# Patient Record
Sex: FEMALE | Race: ASIAN | NOT HISPANIC OR LATINO | Employment: OTHER | URBAN - METROPOLITAN AREA
[De-identification: names, ages, dates, MRNs, and addresses within clinical notes are randomized per-mention and may not be internally consistent; named-entity substitution may affect disease eponyms.]

---

## 2017-01-10 ENCOUNTER — OFFICE VISIT (OUTPATIENT)
Dept: NEUROSURGERY | Facility: CLINIC | Age: 72
End: 2017-01-10

## 2017-01-10 VITALS
BODY MASS INDEX: 20.66 KG/M2 | DIASTOLIC BLOOD PRESSURE: 58 MMHG | WEIGHT: 124 LBS | SYSTOLIC BLOOD PRESSURE: 88 MMHG | TEMPERATURE: 98.4 F | HEIGHT: 65 IN

## 2017-01-10 DIAGNOSIS — M51.36 DEGENERATIVE DISC DISEASE, LUMBAR: Primary | ICD-10-CM

## 2017-01-10 PROCEDURE — 99203 OFFICE O/P NEW LOW 30 MIN: CPT | Performed by: NEUROLOGICAL SURGERY

## 2017-01-10 RX ORDER — OXYCODONE AND ACETAMINOPHEN 10; 325 MG/1; MG/1
1 TABLET ORAL AS NEEDED
COMMUNITY
End: 2020-07-14 | Stop reason: HOSPADM

## 2017-01-10 RX ORDER — MELOXICAM 7.5 MG/1
7.5 TABLET ORAL 2 TIMES DAILY
Qty: 30 TABLET | Refills: 0 | Status: SHIPPED | OUTPATIENT
Start: 2017-01-10 | End: 2020-07-14 | Stop reason: HOSPADM

## 2017-01-10 NOTE — MR AVS SNAPSHOT
Mani Dailey   1/10/2017 10:30 AM   Office Visit    Dept Phone:  [phone]   Encounter #:  [id number]    Provider:  Blade Garcia MD   Department:  [facility]                Your Full Care Plan              Today's Medication Changes          These changes are accurate as of: 1/10/17 11:03 AM.  If you have any questions, ask your nurse or doctor.               New Medication(s)Ordered:     meloxicam 7.5 MG tablet   Commonly known as:  MOBIC   Take 1 tablet by mouth 2 (Two) Times a Day.   Started by:  Blade Garcia MD         Stop taking medication(s)listed here:     acyclovir 400 MG tablet   Commonly known as:  ZOVIRAX   Stopped by:  Blade Garcia MD           buPROPion 75 MG tablet   Commonly known as:  WELLBUTRIN   Stopped by:  Blade Garcia MD           COVARYX HS 0.625-1.25 MG per tablet   Generic drug:  estrogens (conjugated)-methyltestosterone   Stopped by:  Blade Garcia MD                Where to Get Your Medications      These medications were sent to [address] [place] [id number] [place] [phone]   [address] [facility] [address]     Phone:  [phone]     meloxicam 7.5 MG tablet                  Your Updated Medication List          This list is accurate as of: 1/10/17 11:03 AM.  Always use your most recent med list.                ALPRAZolam 1 MG tablet   Commonly known as:  XANAX       busPIRone 15 MG tablet   Commonly known as:  BUSPAR       fluorometholone 0.1 % ophthalmic suspension   Commonly known as:  FML       FLUoxetine 40 MG capsule   Commonly known as:  PROzac       levocetirizine 5 MG tablet   Commonly known as:  XYZAL       meloxicam 7.5 MG tablet   Commonly known as:  MOBIC   Take 1 tablet by mouth 2 (Two) Times a Day.       MELQUIN HP 4 % cream   Generic drug:  hydroquinone       montelukast 10 MG tablet   Commonly known as:  SINGULAIR       oxyCODONE-acetaminophen  MG per tablet   Commonly known as:  PERCOCET       PATADAY 0.2 % solution ophthalmic solution   Generic drug:  olopatadine       potassium chloride 8 MEQ CR tablet   Commonly known as:  KLOR-CON       PROAIR  (90 BASE) MCG/ACT inhaler   Generic drug:  albuterol       topiramate 50 MG tablet   Commonly known as:  TOPAMAX       triamterene-hydrochlorothiazide 75-50 MG per tablet   Commonly known as:  MAXZIDE               We Performed the Following     Ambulatory Referral to Pain Management     SCANNED - IMAGING       You Were Diagnosed With        Codes Comments    Degenerative disc disease, lumbar    -  Primary ICD-10-CM: M51.36  ICD-9-CM: 722.52       Instructions      After seeing Dr. Esquivel, call Dr. Garcia on a Monday or Tuesday and leave a message with Aileen (). Dr. Garcia will call you back at the end of the day as soon as he can.      Patient Instructions History      Upcoming Appointments     Visit Type Date Time Department    NEW PATIENT 1/10/2017 10:30 AM [facility]    COMM - FOLLOWUP PT 2017  3:00 PM [facility]      [employer] Signup     [facility] Data [employer]place allows you to send messages to your doctor, view your test results, renew your prescriptions, schedule appointments, and more. To sign up, go to [employer] and click on the Sign Up Now link in the New User? box. Enter your Data Marketplace Activation Code exactly as it appears below along with the last four digits of your Social Security Number and your Date of Birth () to complete the sign-up process. If you do not sign up before the expiration date, you must request a new code.    Data Marketplace Activation Code: [email or web address]  Expires: 2017 10:57 AM    If you have questions, you can email [email or web address] or call [phone] to talk to our Data Marketplace staff. Remember, Data Marketplace is NOT to be used for urgent needs. For medical emergencies, dial 911.              "  Other Info from Your Visit           Your Appointments     Jan 19, 2017  3:00 PM EST   COMM FOLLOWUP PT with Monica Milan, PT   [facility] PHYSICAL [facility]ERAP[facility] (--)    [address], [address]  [facility] [address] [phone]              Allergies     Toradol [Ketorolac Tromethamine]        Reason for Visit     Back Pain           Vital Signs     Blood Pressure Temperature Height Weight Body Mass Index Smoking Status    88/58 98.4 °F (36.9 °C) 65\" (165.1 cm) 124 lb (56.2 kg) 20.63 kg/m2 Never Smoker      Problems and Diagnoses Noted     Degeneration of lumbar or lumbosacral intervertebral disc    -  Primary      Results     SCANNED - IMAGING               "

## 2017-01-10 NOTE — PATIENT INSTRUCTIONS
After seeing Dr. Esquivel, call Dr. Garcia on a Monday or Tuesday and leave a message with Aileen (). Dr. Garcia will call you back at the end of the day as soon as he can.

## 2017-01-10 NOTE — LETTER
January 10, 2017     Jorge Cameron MD  [facility] [address]    Patient: Mani Dailey   YOB: 1945   Date of Visit: 1/10/2017       Dear Dr. Rakesh MD:    Mani Dailey was in my office today. Below is a copy of my note.    If you have questions, please do not hesitate to call me. I look forward to following Mani along with you.         Sincerely,        Blade Garcia MD        CC: MD Monica Johnson, PT  Mike Esquivel MD    Mani Dailey  1945  [id number]      Chief Complaint   Patient presents with   • Back Pain       HISTORY OF PRESENT ILLNESS:  This is a 71-year-old female who has a genetic predisposition for degenerative osteoarthritis.  She has had a several year history of low back pain but has done reasonably well with physical therapy until the past 6 months.  She now has symptoms consistent with the clinical diagnosis of neurogenic claudication.  She has pain numbness and weakness with ambulation and when she climbs stairs.  She has no bowel or bladder dysfunction however.    Diagnostic studies included lumbar spine x-rays and MRI which prompted neurosurgical consideration and she is referred for an opinion.     Past Medical History   Diagnosis Date   • Asthma    • Bursitis of hip    • Cataract    • Depression    • Hypertension    • IBS (irritable bowel syndrome)    • Lumbosacral disc disease    • Meniere's disease    • Multiple allergies        Past Surgical History   Procedure Laterality Date   • Foot surgery     • Hysterectomy     • D&c and laparoscopy     • Hemorrhoidectomy     • Facial cosmetic surgery         Family History   Problem Relation Age of Onset   • Hypertension Mother    • Stroke Mother    • Heart failure Maternal Aunt    • Stroke Maternal Aunt        Social History     Social History   • Marital status:      Spouse name: N/A   • Number of children: N/A   • Years of education: N/A     Occupational History   • Not on file.      Social History Main Topics   • Smoking status: Never Smoker   • Smokeless tobacco: Not on file   • Alcohol use 4.2 oz/week     7 Glasses of wine per week      Comment: occassionally   • Drug use: No   • Sexual activity: Not on file     Other Topics Concern   • Not on file     Social History Narrative       Allergies   Allergen Reactions   • Toradol [Ketorolac Tromethamine]          Current Outpatient Prescriptions:   •  ALPRAZolam (XANAX) 1 MG tablet, At Night As Needed., Disp: , Rfl: 0  •  busPIRone (BUSPAR) 15 MG tablet, 15 mg 2 (Two) Times a Day., Disp: , Rfl: 1  •  fluorometholone (FML) 0.1 % ophthalmic suspension, , Disp: , Rfl: 0  •  FLUoxetine (PROzac) 40 MG capsule, Daily., Disp: , Rfl: 1  •  levocetirizine (XYZAL) 5 MG tablet, , Disp: , Rfl: 0  •  MELQUIN HP 4 % cream, apply to affected area as directed, Disp: , Rfl: 0  •  montelukast (SINGULAIR) 10 MG tablet, Daily., Disp: , Rfl: 0  •  oxyCODONE-acetaminophen (PERCOCET)  MG per tablet, Take 1 tablet by mouth As Needed for moderate pain (4-6)., Disp: , Rfl:   •  PATADAY 0.2 % solution ophthalmic solution, , Disp: , Rfl: 0  •  potassium chloride (KLOR-CON) 8 MEQ CR tablet, Daily., Disp: , Rfl: 1  •  PROAIR  (90 BASE) MCG/ACT inhaler, , Disp: , Rfl: 1  •  topiramate (TOPAMAX) 50 MG tablet, 150 mg Daily., Disp: , Rfl: 1  •  triamterene-hydrochlorothiazide (MAXZIDE) 75-50 MG per tablet, take 1 tablet by mouth once daily if needed edema, Disp: , Rfl: 0    Review of Systems   Constitutional: Negative for activity change, appetite change, chills, diaphoresis, fatigue, fever and unexpected weight change.   HENT: Positive for hearing loss. Negative for congestion, dental problem, drooling, ear discharge, ear pain, facial swelling, mouth sores, nosebleeds, postnasal drip, rhinorrhea, sinus pressure, sneezing, sore throat, tinnitus, trouble swallowing and voice change.    Eyes: Positive for itching. Negative for photophobia, pain, discharge, redness  "and visual disturbance.   Respiratory: Negative for apnea, cough, choking, chest tightness, shortness of breath, wheezing and stridor.    Cardiovascular: Negative for chest pain, palpitations and leg swelling.   Gastrointestinal: Negative for abdominal distention, abdominal pain, anal bleeding, blood in stool, constipation, diarrhea, nausea, rectal pain and vomiting.   Endocrine: Negative for cold intolerance, heat intolerance, polydipsia, polyphagia and polyuria.   Genitourinary: Positive for difficulty urinating. Negative for decreased urine volume, dysuria, enuresis, flank pain, frequency, genital sores, hematuria and urgency.   Musculoskeletal: Positive for back pain. Negative for arthralgias, gait problem, joint swelling, myalgias, neck pain and neck stiffness.   Skin: Negative for color change, pallor, rash and wound.   Allergic/Immunologic: Negative for environmental allergies, food allergies and immunocompromised state.   Neurological: Positive for weakness. Negative for dizziness, tremors, seizures, syncope, facial asymmetry, speech difficulty, light-headedness, numbness and headaches.   Hematological: Negative for adenopathy. Does not bruise/bleed easily.   Psychiatric/Behavioral: Positive for dysphoric mood and sleep disturbance. Negative for agitation, behavioral problems, confusion, decreased concentration, hallucinations, self-injury and suicidal ideas. The patient is not nervous/anxious and is not hyperactive.    All other systems reviewed and are negative.      Neurological Examination:    Vitals:    01/10/17 1022   BP: (!) 88/58   Temp: 98.4 °F (36.9 °C)   Weight: 124 lb (56.2 kg)   Height: 65\" (165.1 cm)       Mental status/speech: The patient is alert and oriented.  Speech is clear without aphysia or dysarthria.  No overt cognitive deficits.    Cranial nerve examination:    Olfaction: Smell is intact.  Vision: Vision is intact without visual field abnormalities.  Funduscopic examination is normal. " " No pupillary irregularity.  Ocular motor examination: The extraocular muscles are intact.  There is no diplopia.  The pupil is round and reactive to both light and accommodation.  There is no nystagmus.  Facial movement/sensation: There is no facial weakness.  Sensation is intact in the first, second, and third divisions of the trigeminal nerve.  The corneal reflex is intact.  Auditory: Hearing is intact to finger rub bilaterally.  Cranial nerves IX, X, XI, XII: Phonation is normal.  No dysphagia.  Tongue is protruded in the midline without atrophy.  The gag reflex is intact.  Shoulder shrug is normal.    Musculoligamentous ligamentous examination: She had decreased range of motion.  Straight leg raising, Piercy and flip test are negative.  Her strength with encouragement is essentially normal.  The deep tendon reflexes are hypoactive yet are elicitable.  There is no Babinski Danny or clonus.  Her gait is normal.    Medical Decision Making:     Diagnostic Data Set:  Motor spine x-ray show significant degenerative disc disease and disc space narrowing with a scoliosis toward her left area the MRI, unfortunately could not be \"opened\".  The report however shows multilevel degenerative disc disease with moderate to severe facet arthritis at L2/3; 3/4; L4/5 and L5/S1.      Assessment:  [ Multilevel degenerative osteoarthritis with lateral  scoliosis and spinal stenosis. ]          Recommendations:  [ I have given her a prescription for Mobic 7.5 twice a day and have referred her to Mike Enciso for epidural steroid injection and facet block.  She will call me subsequently.  If she shows no improvement I would recommend further studies with an aimed toward surgical intervention.  Apparently she has significant degenerative change, scoliosis and spinal stenosis.  Surgery would entail laminectomies and multilevel spinal fusion with pedicle screw fixation perhaps from L2 through the sacrum.  We'll keep you " informed.]        I greatly appreciate the opportunity to see and evaluate this individual.  If you have questions or concerns regarding issues that I may have overlooked please call me at any time: [phone].  Arthur Garcia M.D.  [facility]  [address] [facility].  [place] [address]    Scribed for Blade Garcia MD by Vaishali Bernabe CMA. 1/10/2017  10:32 AM    I have read and concur with the information provided by the scribe.

## 2017-01-10 NOTE — PROGRESS NOTES
Mani Dailey  1945  [id number]      Chief Complaint   Patient presents with   • Back Pain       HISTORY OF PRESENT ILLNESS:  This is a 71-year-old female who has a genetic predisposition for degenerative osteoarthritis.  She has had a several year history of low back pain but has done reasonably well with physical therapy until the past 6 months.  She now has symptoms consistent with the clinical diagnosis of neurogenic claudication.  She has pain numbness and weakness with ambulation and when she climbs stairs.  She has no bowel or bladder dysfunction however.    Diagnostic studies included lumbar spine x-rays and MRI which prompted neurosurgical consideration and she is referred for an opinion.     Past Medical History   Diagnosis Date   • Asthma    • Bursitis of hip    • Cataract    • Depression    • Hypertension    • IBS (irritable bowel syndrome)    • Lumbosacral disc disease    • Meniere's disease    • Multiple allergies        Past Surgical History   Procedure Laterality Date   • Foot surgery     • Hysterectomy     • D&c and laparoscopy     • Hemorrhoidectomy     • Facial cosmetic surgery         Family History   Problem Relation Age of Onset   • Hypertension Mother    • Stroke Mother    • Heart failure Maternal Aunt    • Stroke Maternal Aunt        Social History     Social History   • Marital status:      Spouse name: N/A   • Number of children: N/A   • Years of education: N/A     Occupational History   • Not on file.     Social History Main Topics   • Smoking status: Never Smoker   • Smokeless tobacco: Not on file   • Alcohol use 4.2 oz/week     7 Glasses of wine per week      Comment: occassionally   • Drug use: No   • Sexual activity: Not on file     Other Topics Concern   • Not on file     Social History Narrative       Allergies   Allergen Reactions   • Toradol [Ketorolac Tromethamine]          Current Outpatient Prescriptions:   •  ALPRAZolam (XANAX) 1 MG tablet, At Night As Needed., Disp: ,  Rfl: 0  •  busPIRone (BUSPAR) 15 MG tablet, 15 mg 2 (Two) Times a Day., Disp: , Rfl: 1  •  fluorometholone (FML) 0.1 % ophthalmic suspension, , Disp: , Rfl: 0  •  FLUoxetine (PROzac) 40 MG capsule, Daily., Disp: , Rfl: 1  •  levocetirizine (XYZAL) 5 MG tablet, , Disp: , Rfl: 0  •  MELQUIN HP 4 % cream, apply to affected area as directed, Disp: , Rfl: 0  •  montelukast (SINGULAIR) 10 MG tablet, Daily., Disp: , Rfl: 0  •  oxyCODONE-acetaminophen (PERCOCET)  MG per tablet, Take 1 tablet by mouth As Needed for moderate pain (4-6)., Disp: , Rfl:   •  PATADAY 0.2 % solution ophthalmic solution, , Disp: , Rfl: 0  •  potassium chloride (KLOR-CON) 8 MEQ CR tablet, Daily., Disp: , Rfl: 1  •  PROAIR  (90 BASE) MCG/ACT inhaler, , Disp: , Rfl: 1  •  topiramate (TOPAMAX) 50 MG tablet, 150 mg Daily., Disp: , Rfl: 1  •  triamterene-hydrochlorothiazide (MAXZIDE) 75-50 MG per tablet, take 1 tablet by mouth once daily if needed edema, Disp: , Rfl: 0    Review of Systems   Constitutional: Negative for activity change, appetite change, chills, diaphoresis, fatigue, fever and unexpected weight change.   HENT: Positive for hearing loss. Negative for congestion, dental problem, drooling, ear discharge, ear pain, facial swelling, mouth sores, nosebleeds, postnasal drip, rhinorrhea, sinus pressure, sneezing, sore throat, tinnitus, trouble swallowing and voice change.    Eyes: Positive for itching. Negative for photophobia, pain, discharge, redness and visual disturbance.   Respiratory: Negative for apnea, cough, choking, chest tightness, shortness of breath, wheezing and stridor.    Cardiovascular: Negative for chest pain, palpitations and leg swelling.   Gastrointestinal: Negative for abdominal distention, abdominal pain, anal bleeding, blood in stool, constipation, diarrhea, nausea, rectal pain and vomiting.   Endocrine: Negative for cold intolerance, heat intolerance, polydipsia, polyphagia and polyuria.   Genitourinary:  "Positive for difficulty urinating. Negative for decreased urine volume, dysuria, enuresis, flank pain, frequency, genital sores, hematuria and urgency.   Musculoskeletal: Positive for back pain. Negative for arthralgias, gait problem, joint swelling, myalgias, neck pain and neck stiffness.   Skin: Negative for color change, pallor, rash and wound.   Allergic/Immunologic: Negative for environmental allergies, food allergies and immunocompromised state.   Neurological: Positive for weakness. Negative for dizziness, tremors, seizures, syncope, facial asymmetry, speech difficulty, light-headedness, numbness and headaches.   Hematological: Negative for adenopathy. Does not bruise/bleed easily.   Psychiatric/Behavioral: Positive for dysphoric mood and sleep disturbance. Negative for agitation, behavioral problems, confusion, decreased concentration, hallucinations, self-injury and suicidal ideas. The patient is not nervous/anxious and is not hyperactive.    All other systems reviewed and are negative.      Neurological Examination:    Vitals:    01/10/17 1022   BP: (!) 88/58   Temp: 98.4 °F (36.9 °C)   Weight: 124 lb (56.2 kg)   Height: 65\" (165.1 cm)       Mental status/speech: The patient is alert and oriented.  Speech is clear without aphysia or dysarthria.  No overt cognitive deficits.    Cranial nerve examination:    Olfaction: Smell is intact.  Vision: Vision is intact without visual field abnormalities.  Funduscopic examination is normal.  No pupillary irregularity.  Ocular motor examination: The extraocular muscles are intact.  There is no diplopia.  The pupil is round and reactive to both light and accommodation.  There is no nystagmus.  Facial movement/sensation: There is no facial weakness.  Sensation is intact in the first, second, and third divisions of the trigeminal nerve.  The corneal reflex is intact.  Auditory: Hearing is intact to finger rub bilaterally.  Cranial nerves IX, X, XI, XII: Phonation is " "normal.  No dysphagia.  Tongue is protruded in the midline without atrophy.  The gag reflex is intact.  Shoulder shrug is normal.    Musculoligamentous ligamentous examination: She had decreased range of motion.  Straight leg raising, Wheatcroft and flip test are negative.  Her strength with encouragement is essentially normal.  The deep tendon reflexes are hypoactive yet are elicitable.  There is no Babinski Danny or clonus.  Her gait is normal.    Medical Decision Making:     Diagnostic Data Set:  Motor spine x-ray show significant degenerative disc disease and disc space narrowing with a scoliosis toward her left area the MRI, unfortunately could not be \"opened\".  The report however shows multilevel degenerative disc disease with moderate to severe facet arthritis at L2/3; 3/4; L4/5 and L5/S1.      Assessment:  [ Multilevel degenerative osteoarthritis with lateral  scoliosis and spinal stenosis. ]          Recommendations:  [ I have given her a prescription for Mobic 7.5 twice a day and have referred her to Mike Enciso for epidural steroid injection and facet block.  She will call me subsequently.  If she shows no improvement I would recommend further studies with an aimed toward surgical intervention.  Apparently she has significant degenerative change, scoliosis and spinal stenosis.  Surgery would entail laminectomies and multilevel spinal fusion with pedicle screw fixation perhaps from L2 through the sacrum.  We'll keep you informed.]        I greatly appreciate the opportunity to see and evaluate this individual.  If you have questions or concerns regarding issues that I may have overlooked please call me at any time: [phone].  Arthur Garcia M.D.  [facility]  [address] [place] [address].  [place] [address]    Scribed for Blade Garcia MD by Vaishali Bernabe CMA. 1/10/2017  10:32 AM    I have read and concur with the information provided by the scribe.   "

## 2017-04-19 ENCOUNTER — DOCUMENTATION (OUTPATIENT)
Dept: PHYSICAL THERAPY | Facility: CLINIC | Age: 72
End: 2017-04-19

## 2017-04-19 NOTE — PROGRESS NOTES
Discharge Summary  Discharge Summary from Physical Therapy Report      Dates  PT visit: 07/2816 through 12/20/16  Number of Visits: 7     Discharge Status of Patient: See progress Note dated 12/20/16    Goals: Partially Met    Discharge Plan: Continue with current home exercise program as instructed    Comments     Date of Discharge 04/19/17        Monica Milan, PT  Physical Therapist

## 2019-04-03 ENCOUNTER — APPOINTMENT (OUTPATIENT)
Dept: PREADMISSION TESTING | Facility: HOSPITAL | Age: 74
End: 2019-04-03

## 2020-04-15 ENCOUNTER — APPOINTMENT (OUTPATIENT)
Dept: PREADMISSION TESTING | Facility: HOSPITAL | Age: 75
End: 2020-04-15

## 2020-05-25 ENCOUNTER — APPOINTMENT (OUTPATIENT)
Dept: PREADMISSION TESTING | Facility: HOSPITAL | Age: 75
End: 2020-05-25

## 2020-05-25 PROCEDURE — U0002 COVID-19 LAB TEST NON-CDC: HCPCS

## 2020-05-25 PROCEDURE — C9803 HOPD COVID-19 SPEC COLLECT: HCPCS

## 2020-05-25 PROCEDURE — U0004 COV-19 TEST NON-CDC HGH THRU: HCPCS

## 2020-05-26 LAB
REF LAB TEST METHOD: NORMAL
SARS-COV-2 RNA RESP QL NAA+PROBE: NOT DETECTED

## 2020-07-12 ENCOUNTER — APPOINTMENT (OUTPATIENT)
Dept: PREADMISSION TESTING | Facility: HOSPITAL | Age: 75
End: 2020-07-12

## 2020-07-12 VITALS — WEIGHT: 123.9 LBS | BODY MASS INDEX: 20.64 KG/M2 | HEIGHT: 65 IN

## 2020-07-12 LAB
BASOPHILS # BLD AUTO: 0.05 10*3/MM3 (ref 0–0.2)
BASOPHILS NFR BLD AUTO: 0.8 % (ref 0–1.5)
DEPRECATED RDW RBC AUTO: 48.8 FL (ref 37–54)
EOSINOPHIL # BLD AUTO: 0.1 10*3/MM3 (ref 0–0.4)
EOSINOPHIL NFR BLD AUTO: 1.6 % (ref 0.3–6.2)
ERYTHROCYTE [DISTWIDTH] IN BLOOD BY AUTOMATED COUNT: 13.6 % (ref 12.3–15.4)
HCT VFR BLD AUTO: 46 % (ref 34–46.6)
HGB BLD-MCNC: 14.8 G/DL (ref 12–15.9)
IMM GRANULOCYTES # BLD AUTO: 0.06 10*3/MM3 (ref 0–0.05)
IMM GRANULOCYTES NFR BLD AUTO: 1 % (ref 0–0.5)
LYMPHOCYTES # BLD AUTO: 1.36 10*3/MM3 (ref 0.7–3.1)
LYMPHOCYTES NFR BLD AUTO: 22.4 % (ref 19.6–45.3)
MCH RBC QN AUTO: 31 PG (ref 26.6–33)
MCHC RBC AUTO-ENTMCNC: 32.2 G/DL (ref 31.5–35.7)
MCV RBC AUTO: 96.4 FL (ref 79–97)
MONOCYTES # BLD AUTO: 0.39 10*3/MM3 (ref 0.1–0.9)
MONOCYTES NFR BLD AUTO: 6.4 % (ref 5–12)
NEUTROPHILS NFR BLD AUTO: 4.12 10*3/MM3 (ref 1.7–7)
NEUTROPHILS NFR BLD AUTO: 67.8 % (ref 42.7–76)
NRBC BLD AUTO-RTO: 0 /100 WBC (ref 0–0.2)
PLATELET # BLD AUTO: 191 10*3/MM3 (ref 140–450)
PMV BLD AUTO: 10 FL (ref 6–12)
POTASSIUM SERPL-SCNC: 3.6 MMOL/L (ref 3.5–5.2)
RBC # BLD AUTO: 4.77 10*6/MM3 (ref 3.77–5.28)
WBC # BLD AUTO: 6.08 10*3/MM3 (ref 3.4–10.8)

## 2020-07-12 PROCEDURE — 36415 COLL VENOUS BLD VENIPUNCTURE: CPT

## 2020-07-12 PROCEDURE — U0002 COVID-19 LAB TEST NON-CDC: HCPCS

## 2020-07-12 PROCEDURE — 93010 ELECTROCARDIOGRAM REPORT: CPT | Performed by: INTERNAL MEDICINE

## 2020-07-12 PROCEDURE — C9803 HOPD COVID-19 SPEC COLLECT: HCPCS

## 2020-07-12 PROCEDURE — 93005 ELECTROCARDIOGRAM TRACING: CPT

## 2020-07-12 PROCEDURE — U0004 COV-19 TEST NON-CDC HGH THRU: HCPCS

## 2020-07-12 PROCEDURE — 85025 COMPLETE CBC W/AUTO DIFF WBC: CPT | Performed by: PLASTIC SURGERY

## 2020-07-12 PROCEDURE — 84132 ASSAY OF SERUM POTASSIUM: CPT | Performed by: PLASTIC SURGERY

## 2020-07-12 RX ORDER — TRAZODONE HYDROCHLORIDE 50 MG/1
25 TABLET ORAL NIGHTLY
COMMUNITY

## 2020-07-12 RX ORDER — CELECOXIB 200 MG/1
200 CAPSULE ORAL DAILY
COMMUNITY

## 2020-07-12 NOTE — PAT
COVID TEST PERFORMED TODAY.    EKG ABNORMAL. PATIENT'S  IS AN MD (DR GARCÍA) PER DR GARCÍA PATIENT JUST HAD AN ANGIOGRAM, HEART CATH, AND PREVIOUS EKG. PER DR GARCÍA NO NEW CHANGES ON EKG. ALSO PT SEES DR VALLE AND WILL CALL TOMORROW FOR CARDIAC CLEARANCE. HOLD NOTE LEFT ON CHART FOR PAT NURSE TO CALL FOR CLEARANCE TOMORROW.     Patient to apply Chlorhexadine wipes  to surgical area (as instructed) the night before procedure and the AM of procedure. Wipes provided.

## 2020-07-13 ENCOUNTER — ANESTHESIA EVENT (OUTPATIENT)
Dept: PERIOP | Facility: HOSPITAL | Age: 75
End: 2020-07-13

## 2020-07-13 LAB
REF LAB TEST METHOD: NORMAL
SARS-COV-2 RNA RESP QL NAA+PROBE: NOT DETECTED

## 2020-07-14 ENCOUNTER — ANESTHESIA (OUTPATIENT)
Dept: PERIOP | Facility: HOSPITAL | Age: 75
End: 2020-07-14

## 2020-07-14 ENCOUNTER — HOSPITAL ENCOUNTER (OUTPATIENT)
Facility: HOSPITAL | Age: 75
Discharge: HOME | End: 2020-07-14
Attending: PLASTIC SURGERY | Admitting: PLASTIC SURGERY

## 2020-07-14 VITALS
HEIGHT: 65 IN | WEIGHT: 123.9 LBS | BODY MASS INDEX: 20.64 KG/M2 | RESPIRATION RATE: 16 BRPM | DIASTOLIC BLOOD PRESSURE: 59 MMHG | OXYGEN SATURATION: 99 % | HEART RATE: 77 BPM | TEMPERATURE: 97.5 F | SYSTOLIC BLOOD PRESSURE: 109 MMHG

## 2020-07-14 DIAGNOSIS — Z41.1 ENCOUNTER FOR COSMETIC SURGERY: Primary | ICD-10-CM

## 2020-07-14 PROCEDURE — 25010000002 DEXAMETHASONE PER 1 MG: Performed by: NURSE ANESTHETIST, CERTIFIED REGISTERED

## 2020-07-14 PROCEDURE — 25010000002 ROPIVACAINE PER 1 MG: Performed by: PLASTIC SURGERY

## 2020-07-14 PROCEDURE — 25010000002 TRIAMCINOLONE PER 10 MG: Performed by: PLASTIC SURGERY

## 2020-07-14 PROCEDURE — 25010000002 ONDANSETRON PER 1 MG: Performed by: NURSE ANESTHETIST, CERTIFIED REGISTERED

## 2020-07-14 PROCEDURE — A9270 NON-COVERED ITEM OR SERVICE: HCPCS | Performed by: PLASTIC SURGERY

## 2020-07-14 PROCEDURE — 25010000003 CEFAZOLIN IN DEXTROSE 2-4 GM/100ML-% SOLUTION: Performed by: PLASTIC SURGERY

## 2020-07-14 PROCEDURE — 25010000002 NEOSTIGMINE 10 MG/10ML SOLUTION: Performed by: NURSE ANESTHETIST, CERTIFIED REGISTERED

## 2020-07-14 PROCEDURE — 25010000002 PROPOFOL 10 MG/ML EMULSION: Performed by: NURSE ANESTHETIST, CERTIFIED REGISTERED

## 2020-07-14 PROCEDURE — 25010000002 FENTANYL CITRATE (PF) 250 MCG/5ML SOLUTION: Performed by: NURSE ANESTHETIST, CERTIFIED REGISTERED

## 2020-07-14 PROCEDURE — 63710000001 HYDROCODONE-ACETAMINOPHEN 5-325 MG TABLET: Performed by: PLASTIC SURGERY

## 2020-07-14 RX ORDER — LIDOCAINE HYDROCHLORIDE 10 MG/ML
INJECTION, SOLUTION EPIDURAL; INFILTRATION; INTRACAUDAL; PERINEURAL AS NEEDED
Status: DISCONTINUED | OUTPATIENT
Start: 2020-07-14 | End: 2020-07-14 | Stop reason: SURG

## 2020-07-14 RX ORDER — DEXAMETHASONE SODIUM PHOSPHATE 4 MG/ML
INJECTION, SOLUTION INTRA-ARTICULAR; INTRALESIONAL; INTRAMUSCULAR; INTRAVENOUS; SOFT TISSUE AS NEEDED
Status: DISCONTINUED | OUTPATIENT
Start: 2020-07-14 | End: 2020-07-14 | Stop reason: SURG

## 2020-07-14 RX ORDER — FENTANYL CITRATE 50 UG/ML
50 INJECTION, SOLUTION INTRAMUSCULAR; INTRAVENOUS
Status: DISCONTINUED | OUTPATIENT
Start: 2020-07-14 | End: 2020-07-14 | Stop reason: HOSPADM

## 2020-07-14 RX ORDER — PROMETHAZINE HYDROCHLORIDE 25 MG/ML
6.25 INJECTION, SOLUTION INTRAMUSCULAR; INTRAVENOUS ONCE AS NEEDED
Status: DISCONTINUED | OUTPATIENT
Start: 2020-07-14 | End: 2020-07-14 | Stop reason: HOSPADM

## 2020-07-14 RX ORDER — HYDROCODONE BITARTRATE AND ACETAMINOPHEN 5; 325 MG/1; MG/1
1 TABLET ORAL EVERY 4 HOURS PRN
Status: DISCONTINUED | OUTPATIENT
Start: 2020-07-14 | End: 2020-07-14 | Stop reason: HOSPADM

## 2020-07-14 RX ORDER — NALOXONE HCL 0.4 MG/ML
0.4 VIAL (ML) INJECTION AS NEEDED
Status: DISCONTINUED | OUTPATIENT
Start: 2020-07-14 | End: 2020-07-14 | Stop reason: HOSPADM

## 2020-07-14 RX ORDER — PROMETHAZINE HYDROCHLORIDE 25 MG/1
25 TABLET ORAL ONCE AS NEEDED
Status: DISCONTINUED | OUTPATIENT
Start: 2020-07-14 | End: 2020-07-14 | Stop reason: HOSPADM

## 2020-07-14 RX ORDER — MIDAZOLAM HYDROCHLORIDE 1 MG/ML
1 INJECTION INTRAMUSCULAR; INTRAVENOUS
Status: DISCONTINUED | OUTPATIENT
Start: 2020-07-14 | End: 2020-07-14 | Stop reason: HOSPADM

## 2020-07-14 RX ORDER — SODIUM CHLORIDE, SODIUM LACTATE, POTASSIUM CHLORIDE, CALCIUM CHLORIDE 600; 310; 30; 20 MG/100ML; MG/100ML; MG/100ML; MG/100ML
INJECTION, SOLUTION INTRAVENOUS CONTINUOUS PRN
Status: DISCONTINUED | OUTPATIENT
Start: 2020-07-14 | End: 2020-07-14 | Stop reason: SURG

## 2020-07-14 RX ORDER — MAGNESIUM HYDROXIDE 1200 MG/15ML
LIQUID ORAL AS NEEDED
Status: DISCONTINUED | OUTPATIENT
Start: 2020-07-14 | End: 2020-07-14 | Stop reason: HOSPADM

## 2020-07-14 RX ORDER — HYDROCODONE BITARTRATE AND ACETAMINOPHEN 5; 325 MG/1; MG/1
1 TABLET ORAL ONCE AS NEEDED
Status: DISCONTINUED | OUTPATIENT
Start: 2020-07-14 | End: 2020-07-14 | Stop reason: HOSPADM

## 2020-07-14 RX ORDER — SODIUM CHLORIDE 0.9 % (FLUSH) 0.9 %
3 SYRINGE (ML) INJECTION EVERY 12 HOURS SCHEDULED
Status: DISCONTINUED | OUTPATIENT
Start: 2020-07-14 | End: 2020-07-14 | Stop reason: HOSPADM

## 2020-07-14 RX ORDER — GLYCOPYRROLATE 0.2 MG/ML
INJECTION INTRAMUSCULAR; INTRAVENOUS AS NEEDED
Status: DISCONTINUED | OUTPATIENT
Start: 2020-07-14 | End: 2020-07-14 | Stop reason: SURG

## 2020-07-14 RX ORDER — FAMOTIDINE 20 MG/1
20 TABLET, FILM COATED ORAL
Status: COMPLETED | OUTPATIENT
Start: 2020-07-14 | End: 2020-07-14

## 2020-07-14 RX ORDER — ONDANSETRON 2 MG/ML
INJECTION INTRAMUSCULAR; INTRAVENOUS AS NEEDED
Status: DISCONTINUED | OUTPATIENT
Start: 2020-07-14 | End: 2020-07-14 | Stop reason: SURG

## 2020-07-14 RX ORDER — PROPOFOL 10 MG/ML
VIAL (ML) INTRAVENOUS AS NEEDED
Status: DISCONTINUED | OUTPATIENT
Start: 2020-07-14 | End: 2020-07-14 | Stop reason: SURG

## 2020-07-14 RX ORDER — PROMETHAZINE HYDROCHLORIDE 12.5 MG/1
12.5 TABLET ORAL EVERY 6 HOURS PRN
Qty: 6 TABLET | Refills: 0 | Status: SHIPPED | OUTPATIENT
Start: 2020-07-14

## 2020-07-14 RX ORDER — PROMETHAZINE HYDROCHLORIDE 25 MG/1
25 SUPPOSITORY RECTAL ONCE AS NEEDED
Status: DISCONTINUED | OUTPATIENT
Start: 2020-07-14 | End: 2020-07-14 | Stop reason: HOSPADM

## 2020-07-14 RX ORDER — FENTANYL CITRATE 50 UG/ML
INJECTION, SOLUTION INTRAMUSCULAR; INTRAVENOUS AS NEEDED
Status: DISCONTINUED | OUTPATIENT
Start: 2020-07-14 | End: 2020-07-14 | Stop reason: SURG

## 2020-07-14 RX ORDER — SODIUM CHLORIDE 0.9 % (FLUSH) 0.9 %
10 SYRINGE (ML) INJECTION EVERY 12 HOURS SCHEDULED
Status: DISCONTINUED | OUTPATIENT
Start: 2020-07-14 | End: 2020-07-14 | Stop reason: HOSPADM

## 2020-07-14 RX ORDER — ROCURONIUM BROMIDE 10 MG/ML
INJECTION, SOLUTION INTRAVENOUS AS NEEDED
Status: DISCONTINUED | OUTPATIENT
Start: 2020-07-14 | End: 2020-07-14 | Stop reason: SURG

## 2020-07-14 RX ORDER — ONDANSETRON 2 MG/ML
4 INJECTION INTRAMUSCULAR; INTRAVENOUS ONCE AS NEEDED
Status: DISCONTINUED | OUTPATIENT
Start: 2020-07-14 | End: 2020-07-14 | Stop reason: HOSPADM

## 2020-07-14 RX ORDER — BALANCED SALT SOLUTION 6.4; .75; .48; .3; 3.9; 1.7 MG/ML; MG/ML; MG/ML; MG/ML; MG/ML; MG/ML
SOLUTION OPHTHALMIC AS NEEDED
Status: DISCONTINUED | OUTPATIENT
Start: 2020-07-14 | End: 2020-07-14 | Stop reason: HOSPADM

## 2020-07-14 RX ORDER — CEFAZOLIN SODIUM 2 G/100ML
2 INJECTION, SOLUTION INTRAVENOUS ONCE
Status: COMPLETED | OUTPATIENT
Start: 2020-07-14 | End: 2020-07-14

## 2020-07-14 RX ORDER — SODIUM CHLORIDE, SODIUM LACTATE, POTASSIUM CHLORIDE, CALCIUM CHLORIDE 600; 310; 30; 20 MG/100ML; MG/100ML; MG/100ML; MG/100ML
9 INJECTION, SOLUTION INTRAVENOUS CONTINUOUS
Status: DISCONTINUED | OUTPATIENT
Start: 2020-07-14 | End: 2020-07-14 | Stop reason: HOSPADM

## 2020-07-14 RX ORDER — LIDOCAINE HYDROCHLORIDE 10 MG/ML
0.5 INJECTION, SOLUTION EPIDURAL; INFILTRATION; INTRACAUDAL; PERINEURAL ONCE AS NEEDED
Status: COMPLETED | OUTPATIENT
Start: 2020-07-14 | End: 2020-07-14

## 2020-07-14 RX ORDER — SODIUM CHLORIDE 0.9 % (FLUSH) 0.9 %
3-10 SYRINGE (ML) INJECTION AS NEEDED
Status: DISCONTINUED | OUTPATIENT
Start: 2020-07-14 | End: 2020-07-14 | Stop reason: HOSPADM

## 2020-07-14 RX ORDER — HYDROMORPHONE HYDROCHLORIDE 1 MG/ML
0.5 INJECTION, SOLUTION INTRAMUSCULAR; INTRAVENOUS; SUBCUTANEOUS
Status: DISCONTINUED | OUTPATIENT
Start: 2020-07-14 | End: 2020-07-14 | Stop reason: HOSPADM

## 2020-07-14 RX ORDER — NEOSTIGMINE METHYLSULFATE 1 MG/ML
INJECTION, SOLUTION INTRAVENOUS AS NEEDED
Status: DISCONTINUED | OUTPATIENT
Start: 2020-07-14 | End: 2020-07-14 | Stop reason: SURG

## 2020-07-14 RX ORDER — SODIUM CHLORIDE 0.9 % (FLUSH) 0.9 %
10 SYRINGE (ML) INJECTION AS NEEDED
Status: DISCONTINUED | OUTPATIENT
Start: 2020-07-14 | End: 2020-07-14 | Stop reason: HOSPADM

## 2020-07-14 RX ORDER — IPRATROPIUM BROMIDE AND ALBUTEROL SULFATE 2.5; .5 MG/3ML; MG/3ML
3 SOLUTION RESPIRATORY (INHALATION) ONCE AS NEEDED
Status: DISCONTINUED | OUTPATIENT
Start: 2020-07-14 | End: 2020-07-14 | Stop reason: HOSPADM

## 2020-07-14 RX ORDER — HYDROCODONE BITARTRATE AND ACETAMINOPHEN 5; 325 MG/1; MG/1
1 TABLET ORAL EVERY 4 HOURS PRN
Qty: 6 TABLET | Refills: 0 | Status: SHIPPED | OUTPATIENT
Start: 2020-07-14

## 2020-07-14 RX ADMIN — HYDROCODONE BITARTRATE AND ACETAMINOPHEN 1 TABLET: 5; 325 TABLET ORAL at 15:49

## 2020-07-14 RX ADMIN — EPHEDRINE SULFATE 5 MG: 50 INJECTION INTRAMUSCULAR; INTRAVENOUS; SUBCUTANEOUS at 11:22

## 2020-07-14 RX ADMIN — DEXAMETHASONE SODIUM PHOSPHATE 4 MG: 4 INJECTION, SOLUTION INTRAMUSCULAR; INTRAVENOUS at 07:50

## 2020-07-14 RX ADMIN — LIDOCAINE HYDROCHLORIDE 50 MG: 10 INJECTION, SOLUTION EPIDURAL; INFILTRATION; INTRACAUDAL; PERINEURAL at 07:46

## 2020-07-14 RX ADMIN — SODIUM CHLORIDE, POTASSIUM CHLORIDE, SODIUM LACTATE AND CALCIUM CHLORIDE: 600; 310; 30; 20 INJECTION, SOLUTION INTRAVENOUS at 10:30

## 2020-07-14 RX ADMIN — FENTANYL CITRATE 25 MCG: 50 INJECTION, SOLUTION INTRAMUSCULAR; INTRAVENOUS at 13:15

## 2020-07-14 RX ADMIN — PROPOFOL 200 MG: 10 INJECTION, EMULSION INTRAVENOUS at 07:46

## 2020-07-14 RX ADMIN — CEFAZOLIN SODIUM 1 G: 2 INJECTION, SOLUTION INTRAVENOUS at 12:30

## 2020-07-14 RX ADMIN — EPHEDRINE SULFATE 5 MG: 50 INJECTION INTRAMUSCULAR; INTRAVENOUS; SUBCUTANEOUS at 11:24

## 2020-07-14 RX ADMIN — ROCURONIUM BROMIDE 50 MG: 10 INJECTION INTRAVENOUS at 07:46

## 2020-07-14 RX ADMIN — SODIUM CHLORIDE, POTASSIUM CHLORIDE, SODIUM LACTATE AND CALCIUM CHLORIDE: 600; 310; 30; 20 INJECTION, SOLUTION INTRAVENOUS at 12:28

## 2020-07-14 RX ADMIN — GLYCOPYRROLATE 0.4 MG: 0.2 INJECTION INTRAMUSCULAR; INTRAVENOUS at 13:35

## 2020-07-14 RX ADMIN — SODIUM CHLORIDE, POTASSIUM CHLORIDE, SODIUM LACTATE AND CALCIUM CHLORIDE: 600; 310; 30; 20 INJECTION, SOLUTION INTRAVENOUS at 07:41

## 2020-07-14 RX ADMIN — ROCURONIUM BROMIDE 10 MG: 10 INJECTION INTRAVENOUS at 12:45

## 2020-07-14 RX ADMIN — PROPOFOL 30 MG: 10 INJECTION, EMULSION INTRAVENOUS at 12:43

## 2020-07-14 RX ADMIN — SODIUM CHLORIDE, POTASSIUM CHLORIDE, SODIUM LACTATE AND CALCIUM CHLORIDE 9 ML/HR: 600; 310; 30; 20 INJECTION, SOLUTION INTRAVENOUS at 07:15

## 2020-07-14 RX ADMIN — CEFAZOLIN SODIUM 2 G: 2 INJECTION, SOLUTION INTRAVENOUS at 07:45

## 2020-07-14 RX ADMIN — ROCURONIUM BROMIDE 10 MG: 10 INJECTION INTRAVENOUS at 10:45

## 2020-07-14 RX ADMIN — ONDANSETRON 4 MG: 2 INJECTION INTRAMUSCULAR; INTRAVENOUS at 13:21

## 2020-07-14 RX ADMIN — FAMOTIDINE 20 MG: 20 TABLET, FILM COATED ORAL at 07:14

## 2020-07-14 RX ADMIN — DEXAMETHASONE SODIUM PHOSPHATE 4 MG: 4 INJECTION, SOLUTION INTRAMUSCULAR; INTRAVENOUS at 13:21

## 2020-07-14 RX ADMIN — LIDOCAINE HYDROCHLORIDE 0.3 ML: 10 INJECTION, SOLUTION EPIDURAL; INFILTRATION; INTRACAUDAL; PERINEURAL at 07:14

## 2020-07-14 RX ADMIN — NEOSTIGMINE 3 MG: 1 INJECTION INTRAVENOUS at 13:35

## 2020-07-14 NOTE — PLAN OF CARE
Problem: Patient Care Overview  Goal: Plan of Care Review  Outcome: Ongoing (interventions implemented as appropriate)  Flowsheets (Taken 7/14/2020 5920)  Progress: improving  Plan of Care Reviewed With: patient  Outcome Summary: patient transferred from PACU, VSS, patient tolerating oral intake and pain under control, patient being discharged home

## 2020-07-14 NOTE — BRIEF OP NOTE
FACELIFT FULL, BLEPHAROPLASTY  Progress Note    Mani Dailey  7/14/2020    Pre-op Diagnosis:   1.  Bulging at medial aspect of bilateral upper eyelids  2.  Facial ptosis        Post-Op Diagnosis Codes:     * Encounter for cosmetic surgery [Z41.1]    Procedure/CPT® Codes:  CO REVISION OF UPPER EYELID [65577]  CO REMOVAL OF SKIN WRINKLES [92027]    Procedure(s):  FACELIFT  RESECTION OF THE UPPER EYELID  MEDIAL FAT PADS    Surgeon(s):  Erma Scott MD    Anesthesia: General    Staff:   Circulator: Shania Jimenez RN; Leslee Glaser RN; Crystal Lang RN  Scrub Person: Kimberly Rob; Nazia Emanuel    Estimated Blood Loss: 20 mL    Urine Voided: 580 mL      Drains:   [REMOVED] Urethral Catheter Silicone 16 Fr. (Removed)       Complications: None      Erma Scott MD     Date: 7/14/2020  Time: 14:22

## 2020-07-14 NOTE — ANESTHESIA POSTPROCEDURE EVALUATION
Patient: Mani Dailey    Procedure Summary     Date:  07/14/20 Room / Location:   [facility]    Anesthesia Start:  0742 Anesthesia Stop:      Procedures:       FACELIFT (N/A Face)      RESECTION OF THE UPPER EYELID  MEDIAL FAT PADS (Bilateral Eye) Diagnosis:      Surgeon:  Erma Scott MD Provider:  Wagner Nails MD    Anesthesia Type:  general ASA Status:  2          Anesthesia Type: general    Vitals  Vitals Value Taken Time   /56 7/14/2020  2:07 PM   Temp 97.2 °F (36.2 °C) 7/14/2020  2:07 PM   Pulse 87 7/14/2020  2:16 PM   Resp 16 7/14/2020  2:07 PM   SpO2 98 % 7/14/2020  2:16 PM   Vitals shown include unvalidated device data.        Post Anesthesia Care and Evaluation    Patient location during evaluation: PACU  Patient participation: complete - patient participated  Level of consciousness: awake and alert  Pain score: 0  Pain management: adequate  Airway patency: patent  Anesthetic complications: No anesthetic complications  PONV Status: none  Cardiovascular status: hemodynamically stable and acceptable  Respiratory status: nonlabored ventilation, acceptable and nasal cannula  Hydration status: acceptable    Comments: Patient transported to PACU on 3L NC breathing spontaneously, awake and alert. VSS. Report to PACU RN. T 97.2, RR 12, /56, SpO2 99%, HR 87.

## 2020-07-14 NOTE — ANESTHESIA PREPROCEDURE EVALUATION
Anesthesia Evaluation     Patient summary reviewed and Nursing notes reviewed   history of anesthetic complications: PONV  NPO Solid Status: > 8 hours  NPO Liquid Status: > 2 hours           Airway   Mallampati: III  TM distance: >3 FB  Neck ROM: full  Possible difficult intubation  Dental - normal exam     Pulmonary    (+) asthma,decreased breath sounds,   Cardiovascular   Exercise tolerance: good (4-7 METS)    Rhythm: regular  Rate: normal    (+) hypertension well controlled 2 medications or greater,       Neuro/Psych  (+) dizziness/light headedness, numbness, psychiatric history Depression,     GI/Hepatic/Renal/Endo      Musculoskeletal     Abdominal   (-) obese    Abdomen: soft.   Substance History      OB/GYN          Other   arthritis,                      Anesthesia Plan    ASA 2     general     intravenous induction     Anesthetic plan, all risks, benefits, and alternatives have been provided, discussed and informed consent has been obtained with: patient.    Plan discussed with CRNA.       Yes

## 2020-07-14 NOTE — OP NOTE
Operative Report    Preoperative Diagnosis:   1.  Bulging at medial aspect of bilateral upper eyelids.   2.  Facial ptosis    Postoperative Diagnosis: Same     Procedure:   1.  Removal of fat pads from medial fat compartments bilateral upper eyelids  2.  Facelift    Surgeon: Erma Travis M.D.    Anesthesia: General endotracheal    Estimated Blood Loss:  Minimal    Complications:  None    Procedure in Detail:  The patient was identified and taken to the OR where she was placed in a supine position and general anesthesia was induced.  Bilateral sequential compression devices were placed prior to induction of anesthesia.  Care was taken to position her extremities.  The patient was then prepped and draped in a sterile fashion.  Markings had been made at both upper eyelids along the old scars.  The left upper eyelid had slightly more skin than the right.  The patient was noted to have a mild lagophthalmos and therefore it was determined that a minimal amount of skin would be removed.  Local anesthesia was infiltrated into the region of the markings.  Attention was first directed to the right side. A 15 blade scalpel was used to incise along the marking at the upper eyelid crease 9 mm above the eyelid margin just below the old scar. A second incision was made above the scar creating an elliptical incision, less than 1 mm in width, to remove the old scar.  Hemostasis was achieved with Bovie electrocautery.  A small myotomy was made medially using Bovie electrocautery.  The medial fat compartment was identified and the fatty tissue noted to be extremely fibrous in nature.  It was not easily teased from the fat compartment, and therefore the excess fat was held with a pickups and cauterized to shrink it.  The contour was examined externally and this was done several times until a flat contour was obtained.  After irrigating with balanced salt solution, three supratarsal sutures were placed.  Skin closure then  proceeded using a running 6-0 Nylon suture. A similar procedure as described above was performed on the left side.  Approximately 1 to 2 mm of skin was excised at the level of the old scar in order to achieve symmetry with the contralateral side.  The fatty tissue at the medial  fat compartment was also noted to be significantly fibrous and that was treated in a similar fashion.  Care was taken to achieve symmetry.  Lausier proceeded as described above.  Lacri-Lube ointment was placed within the eyes and the eyes temporarily taped shut with Steri-Strips.    Local anesthesia was then infiltrated into the region of the markings made in the preauricular and intertragal areas as well as the inferior neck and lateral cheeks.  Attention was first directed to the left side.  A 15 blade scalpel was used to incise along the markings take care to preserve the preauricular anatomy and carried down to the level of the subcutaneous tissues.  Dissection was then carried out at the level of the subcutaneous tissues elevating a flap approximately 4 mm in thickness.  The flap was elevated for a distance of approximately 5 cm anteriorly.  Dissection was carried out to the level of the marionette lines to release them.  Inferiorly dissection was carried out at the superior aspect of the lateral neck and carried anteriorly to release the skin for redraping.  Hemostasis was achieved with Bovie electrocautery.  A similar dissection was carried out on the right side.  Conservative liposuction then proceeded of the submental area and jawline. Less than 5 mL of fat was removed.  The mobile SMAS was elevated in a superior oblique direction and sutured to the immobile SMAS using 3-0 Vicryl sutures.  The SMAS plication was reinforced with a running Vicryl suture.  This was done bilaterally.  Care was taken to achieve symmetry.  The skin was then redraped and the excess skin excised.  The tissues were then irrigated with normal saline solution  and checked for hemostasis which was achieved with bipolar and Bovie electrocautery.  Closure then proceeded placing key sutures of 4-0 Monocryl at the level of the deep dermis.  Skin closure proceeded with running 6-0 Prolene suture.  Antibiotic ointment and a dry bulky dressing were applied.  Steri-Strips were removed from the eyelids and ophthalmic antibiotic ointment applied.  The patient tolerated the procedure well.  She was extubated and transferred to the recovery room in stable condition.

## 2020-07-14 NOTE — DISCHARGE INSTRUCTIONS
Apply Lacrilube ointment or artificial tears ointment into eyes qhs.  Apply Bacitracin ophthalmic ointment to eyelid incisions TID.  Apply moistened saline gauze to cover both eyes and apply ice pack over gauze as much as possible until seen in office tomorrow.  Leave remaining dressings in placed until seen by surgeon.  HOB elevated 30-45 degrees.  No strenuous activity.

## 2020-08-31 ENCOUNTER — APPOINTMENT (OUTPATIENT)
Dept: PREADMISSION TESTING | Facility: HOSPITAL | Age: 75
End: 2020-08-31

## 2020-08-31 PROCEDURE — U0004 COV-19 TEST NON-CDC HGH THRU: HCPCS

## 2020-08-31 PROCEDURE — C9803 HOPD COVID-19 SPEC COLLECT: HCPCS

## 2020-08-31 PROCEDURE — U0002 COVID-19 LAB TEST NON-CDC: HCPCS

## 2020-09-02 LAB
REF LAB TEST METHOD: NORMAL
SARS-COV-2 RNA RESP QL NAA+PROBE: NOT DETECTED

## 2020-12-13 ENCOUNTER — APPOINTMENT (OUTPATIENT)
Dept: PREADMISSION TESTING | Facility: HOSPITAL | Age: 75
End: 2020-12-13

## 2020-12-13 PROCEDURE — C9803 HOPD COVID-19 SPEC COLLECT: HCPCS

## 2020-12-13 PROCEDURE — U0004 COV-19 TEST NON-CDC HGH THRU: HCPCS

## 2020-12-14 LAB — SARS-COV-2 RNA RESP QL NAA+PROBE: NOT DETECTED

## 2023-10-25 ENCOUNTER — LAB (OUTPATIENT)
Dept: LAB | Facility: HOSPITAL | Age: 78
End: 2023-10-25
Payer: MEDICARE

## 2023-10-25 ENCOUNTER — TRANSCRIBE ORDERS (OUTPATIENT)
Dept: LAB | Facility: HOSPITAL | Age: 78
End: 2023-10-25
Payer: MEDICARE

## 2023-10-25 DIAGNOSIS — I10 ESSENTIAL HYPERTENSION, MALIGNANT: Primary | ICD-10-CM

## 2023-10-25 DIAGNOSIS — I10 ESSENTIAL HYPERTENSION, MALIGNANT: ICD-10-CM

## 2023-10-25 LAB
ANION GAP SERPL CALCULATED.3IONS-SCNC: 7.1 MMOL/L (ref 5–15)
BUN SERPL-MCNC: 13 MG/DL (ref 8–23)
BUN/CREAT SERPL: 11.2 (ref 7–25)
CALCIUM SPEC-SCNC: 8.7 MG/DL (ref 8.6–10.5)
CHLORIDE SERPL-SCNC: 106 MMOL/L (ref 98–107)
CO2 SERPL-SCNC: 23.9 MMOL/L (ref 22–29)
CREAT SERPL-MCNC: 1.16 MG/DL (ref 0.57–1)
DEPRECATED RDW RBC AUTO: 42.9 FL (ref 37–54)
EGFRCR SERPLBLD CKD-EPI 2021: 48.7 ML/MIN/1.73
ERYTHROCYTE [DISTWIDTH] IN BLOOD BY AUTOMATED COUNT: 12.6 % (ref 12.3–15.4)
GLUCOSE SERPL-MCNC: 89 MG/DL (ref 65–99)
HCT VFR BLD AUTO: 40.1 % (ref 34–46.6)
HGB BLD-MCNC: 13.3 G/DL (ref 12–15.9)
MCH RBC QN AUTO: 31.2 PG (ref 26.6–33)
MCHC RBC AUTO-ENTMCNC: 33.2 G/DL (ref 31.5–35.7)
MCV RBC AUTO: 94.1 FL (ref 79–97)
PLATELET # BLD AUTO: 182 10*3/MM3 (ref 140–450)
PMV BLD AUTO: 10.8 FL (ref 6–12)
POTASSIUM SERPL-SCNC: 3.7 MMOL/L (ref 3.5–5.2)
RBC # BLD AUTO: 4.26 10*6/MM3 (ref 3.77–5.28)
SODIUM SERPL-SCNC: 137 MMOL/L (ref 136–145)
WBC NRBC COR # BLD: 7.28 10*3/MM3 (ref 3.4–10.8)

## 2023-10-25 PROCEDURE — 85027 COMPLETE CBC AUTOMATED: CPT | Performed by: COLON & RECTAL SURGERY

## 2023-10-25 PROCEDURE — 36415 COLL VENOUS BLD VENIPUNCTURE: CPT

## 2023-10-25 PROCEDURE — 80048 BASIC METABOLIC PNL TOTAL CA: CPT

## 2023-10-26 ENCOUNTER — ANESTHESIA EVENT (OUTPATIENT)
Dept: PERIOP | Facility: HOSPITAL | Age: 78
End: 2023-10-26
Payer: MEDICARE

## 2023-10-26 ENCOUNTER — ANESTHESIA (OUTPATIENT)
Dept: PERIOP | Facility: HOSPITAL | Age: 78
End: 2023-10-26
Payer: MEDICARE

## 2023-10-26 ENCOUNTER — HOSPITAL ENCOUNTER (OUTPATIENT)
Facility: HOSPITAL | Age: 78
Setting detail: SURGERY ADMIT
Discharge: HOME | End: 2023-10-26
Attending: COLON & RECTAL SURGERY | Admitting: COLON & RECTAL SURGERY
Payer: MEDICARE

## 2023-10-26 VITALS
WEIGHT: 126 LBS | DIASTOLIC BLOOD PRESSURE: 86 MMHG | OXYGEN SATURATION: 99 % | HEART RATE: 63 BPM | RESPIRATION RATE: 16 BRPM | TEMPERATURE: 97.6 F | SYSTOLIC BLOOD PRESSURE: 176 MMHG | HEIGHT: 64 IN | BODY MASS INDEX: 21.51 KG/M2

## 2023-10-26 DIAGNOSIS — K60.2 ANAL FISSURE: ICD-10-CM

## 2023-10-26 DIAGNOSIS — K60.2 FISSURE, ANAL: Primary | ICD-10-CM

## 2023-10-26 PROBLEM — K59.4 ANAL SPHINCTER SPASM: Status: ACTIVE | Noted: 2023-10-26

## 2023-10-26 PROBLEM — K64.4 ANAL SKIN TAG: Status: ACTIVE | Noted: 2023-10-26

## 2023-10-26 PROCEDURE — 25010000002 ONDANSETRON PER 1 MG

## 2023-10-26 PROCEDURE — 88304 TISSUE EXAM BY PATHOLOGIST: CPT | Performed by: COLON & RECTAL SURGERY

## 2023-10-26 PROCEDURE — 93010 ELECTROCARDIOGRAM REPORT: CPT | Performed by: INTERNAL MEDICINE

## 2023-10-26 PROCEDURE — 25810000003 LACTATED RINGERS PER 1000 ML: Performed by: ANESTHESIOLOGY

## 2023-10-26 PROCEDURE — 25010000002 DEXAMETHASONE PER 1 MG

## 2023-10-26 PROCEDURE — 25010000002 FENTANYL CITRATE (PF) 100 MCG/2ML SOLUTION

## 2023-10-26 PROCEDURE — 93005 ELECTROCARDIOGRAM TRACING: CPT | Performed by: ANESTHESIOLOGY

## 2023-10-26 PROCEDURE — 25010000002 PROPOFOL 10 MG/ML EMULSION

## 2023-10-26 RX ORDER — DEXAMETHASONE SODIUM PHOSPHATE 4 MG/ML
INJECTION, SOLUTION INTRA-ARTICULAR; INTRALESIONAL; INTRAMUSCULAR; INTRAVENOUS; SOFT TISSUE AS NEEDED
Status: DISCONTINUED | OUTPATIENT
Start: 2023-10-26 | End: 2023-10-26 | Stop reason: SURG

## 2023-10-26 RX ORDER — DROPERIDOL 2.5 MG/ML
0.62 INJECTION, SOLUTION INTRAMUSCULAR; INTRAVENOUS ONCE AS NEEDED
Status: DISCONTINUED | OUTPATIENT
Start: 2023-10-26 | End: 2023-10-26 | Stop reason: HOSPADM

## 2023-10-26 RX ORDER — SODIUM CHLORIDE, SODIUM LACTATE, POTASSIUM CHLORIDE, CALCIUM CHLORIDE 600; 310; 30; 20 MG/100ML; MG/100ML; MG/100ML; MG/100ML
9 INJECTION, SOLUTION INTRAVENOUS CONTINUOUS PRN
Status: DISCONTINUED | OUTPATIENT
Start: 2023-10-26 | End: 2023-10-26 | Stop reason: HOSPADM

## 2023-10-26 RX ORDER — GLYCOPYRROLATE 0.2 MG/ML
INJECTION INTRAMUSCULAR; INTRAVENOUS AS NEEDED
Status: DISCONTINUED | OUTPATIENT
Start: 2023-10-26 | End: 2023-10-26 | Stop reason: SURG

## 2023-10-26 RX ORDER — SODIUM CHLORIDE 9 MG/ML
40 INJECTION, SOLUTION INTRAVENOUS AS NEEDED
Status: DISCONTINUED | OUTPATIENT
Start: 2023-10-26 | End: 2023-10-26 | Stop reason: HOSPADM

## 2023-10-26 RX ORDER — HYDROCODONE BITARTRATE AND ACETAMINOPHEN 7.5; 325 MG/1; MG/1
1 TABLET ORAL EVERY 6 HOURS PRN
Qty: 20 TABLET | Refills: 0 | Status: SHIPPED | OUTPATIENT
Start: 2023-10-26 | End: 2023-10-31

## 2023-10-26 RX ORDER — IPRATROPIUM BROMIDE AND ALBUTEROL SULFATE 2.5; .5 MG/3ML; MG/3ML
3 SOLUTION RESPIRATORY (INHALATION) ONCE AS NEEDED
Status: DISCONTINUED | OUTPATIENT
Start: 2023-10-26 | End: 2023-10-26 | Stop reason: HOSPADM

## 2023-10-26 RX ORDER — LIDOCAINE HYDROCHLORIDE 10 MG/ML
0.5 INJECTION, SOLUTION EPIDURAL; INFILTRATION; INTRACAUDAL; PERINEURAL ONCE AS NEEDED
Status: DISCONTINUED | OUTPATIENT
Start: 2023-10-26 | End: 2023-10-26 | Stop reason: SDUPTHER

## 2023-10-26 RX ORDER — LIDOCAINE HYDROCHLORIDE 10 MG/ML
INJECTION, SOLUTION EPIDURAL; INFILTRATION; INTRACAUDAL; PERINEURAL AS NEEDED
Status: DISCONTINUED | OUTPATIENT
Start: 2023-10-26 | End: 2023-10-26 | Stop reason: SURG

## 2023-10-26 RX ORDER — SODIUM CHLORIDE 0.9 % (FLUSH) 0.9 %
3-10 SYRINGE (ML) INJECTION AS NEEDED
Status: DISCONTINUED | OUTPATIENT
Start: 2023-10-26 | End: 2023-10-26 | Stop reason: HOSPADM

## 2023-10-26 RX ORDER — SODIUM CHLORIDE 0.9 % (FLUSH) 0.9 %
10 SYRINGE (ML) INJECTION EVERY 12 HOURS SCHEDULED
Status: DISCONTINUED | OUTPATIENT
Start: 2023-10-26 | End: 2023-10-26 | Stop reason: HOSPADM

## 2023-10-26 RX ORDER — SODIUM CHLORIDE 0.9 % (FLUSH) 0.9 %
10 SYRINGE (ML) INJECTION AS NEEDED
Status: DISCONTINUED | OUTPATIENT
Start: 2023-10-26 | End: 2023-10-26 | Stop reason: HOSPADM

## 2023-10-26 RX ORDER — ONDANSETRON 2 MG/ML
INJECTION INTRAMUSCULAR; INTRAVENOUS AS NEEDED
Status: DISCONTINUED | OUTPATIENT
Start: 2023-10-26 | End: 2023-10-26 | Stop reason: SURG

## 2023-10-26 RX ORDER — HYDROMORPHONE HYDROCHLORIDE 1 MG/ML
0.5 INJECTION, SOLUTION INTRAMUSCULAR; INTRAVENOUS; SUBCUTANEOUS
Status: DISCONTINUED | OUTPATIENT
Start: 2023-10-26 | End: 2023-10-26 | Stop reason: HOSPADM

## 2023-10-26 RX ORDER — LABETALOL HYDROCHLORIDE 5 MG/ML
5 INJECTION, SOLUTION INTRAVENOUS
Status: DISCONTINUED | OUTPATIENT
Start: 2023-10-26 | End: 2023-10-26 | Stop reason: HOSPADM

## 2023-10-26 RX ORDER — MAGNESIUM HYDROXIDE 1200 MG/15ML
LIQUID ORAL AS NEEDED
Status: DISCONTINUED | OUTPATIENT
Start: 2023-10-26 | End: 2023-10-26 | Stop reason: HOSPADM

## 2023-10-26 RX ORDER — FENTANYL CITRATE 50 UG/ML
INJECTION, SOLUTION INTRAMUSCULAR; INTRAVENOUS AS NEEDED
Status: DISCONTINUED | OUTPATIENT
Start: 2023-10-26 | End: 2023-10-26 | Stop reason: SURG

## 2023-10-26 RX ORDER — LIDOCAINE HYDROCHLORIDE 10 MG/ML
0.5 INJECTION, SOLUTION EPIDURAL; INFILTRATION; INTRACAUDAL; PERINEURAL ONCE AS NEEDED
Status: COMPLETED | OUTPATIENT
Start: 2023-10-26 | End: 2023-10-26

## 2023-10-26 RX ORDER — MIDAZOLAM HYDROCHLORIDE 1 MG/ML
0.5 INJECTION INTRAMUSCULAR; INTRAVENOUS
Status: DISCONTINUED | OUTPATIENT
Start: 2023-10-26 | End: 2023-10-26 | Stop reason: HOSPADM

## 2023-10-26 RX ORDER — ONDANSETRON 2 MG/ML
4 INJECTION INTRAMUSCULAR; INTRAVENOUS ONCE AS NEEDED
Status: DISCONTINUED | OUTPATIENT
Start: 2023-10-26 | End: 2023-10-26 | Stop reason: HOSPADM

## 2023-10-26 RX ORDER — BUPIVACAINE HYDROCHLORIDE AND EPINEPHRINE 2.5; 5 MG/ML; UG/ML
INJECTION, SOLUTION EPIDURAL; INFILTRATION; INTRACAUDAL; PERINEURAL AS NEEDED
Status: DISCONTINUED | OUTPATIENT
Start: 2023-10-26 | End: 2023-10-26 | Stop reason: HOSPADM

## 2023-10-26 RX ORDER — FENTANYL CITRATE 50 UG/ML
50 INJECTION, SOLUTION INTRAMUSCULAR; INTRAVENOUS
Status: DISCONTINUED | OUTPATIENT
Start: 2023-10-26 | End: 2023-10-26 | Stop reason: HOSPADM

## 2023-10-26 RX ORDER — HYDRALAZINE HYDROCHLORIDE 20 MG/ML
5 INJECTION INTRAMUSCULAR; INTRAVENOUS
Status: DISCONTINUED | OUTPATIENT
Start: 2023-10-26 | End: 2023-10-26 | Stop reason: HOSPADM

## 2023-10-26 RX ORDER — OXYCODONE AND ACETAMINOPHEN 7.5; 325 MG/1; MG/1
1 TABLET ORAL EVERY 8 HOURS PRN
COMMUNITY
End: 2023-10-26 | Stop reason: HOSPADM

## 2023-10-26 RX ORDER — SODIUM CHLORIDE 0.9 % (FLUSH) 0.9 %
3 SYRINGE (ML) INJECTION EVERY 12 HOURS SCHEDULED
Status: DISCONTINUED | OUTPATIENT
Start: 2023-10-26 | End: 2023-10-26 | Stop reason: HOSPADM

## 2023-10-26 RX ORDER — PROPOFOL 10 MG/ML
VIAL (ML) INTRAVENOUS AS NEEDED
Status: DISCONTINUED | OUTPATIENT
Start: 2023-10-26 | End: 2023-10-26 | Stop reason: SURG

## 2023-10-26 RX ORDER — HYDROCODONE BITARTRATE AND ACETAMINOPHEN 5; 325 MG/1; MG/1
1 TABLET ORAL ONCE AS NEEDED
Status: DISCONTINUED | OUTPATIENT
Start: 2023-10-26 | End: 2023-10-26 | Stop reason: HOSPADM

## 2023-10-26 RX ORDER — HYDROCODONE BITARTRATE AND ACETAMINOPHEN 7.5; 325 MG/1; MG/1
1-2 TABLET ORAL EVERY 6 HOURS PRN
Qty: 20 TABLET | Refills: 0 | Status: SHIPPED | OUTPATIENT
Start: 2023-10-26

## 2023-10-26 RX ORDER — DOCUSATE SODIUM 100 MG/1
100 CAPSULE, LIQUID FILLED ORAL 3 TIMES DAILY
Status: DISCONTINUED | OUTPATIENT
Start: 2023-10-26 | End: 2023-10-26 | Stop reason: HOSPADM

## 2023-10-26 RX ORDER — DROPERIDOL 2.5 MG/ML
0.62 INJECTION, SOLUTION INTRAMUSCULAR; INTRAVENOUS
Status: DISCONTINUED | OUTPATIENT
Start: 2023-10-26 | End: 2023-10-26 | Stop reason: HOSPADM

## 2023-10-26 RX ORDER — FAMOTIDINE 20 MG/1
20 TABLET, FILM COATED ORAL
Status: COMPLETED | OUTPATIENT
Start: 2023-10-26 | End: 2023-10-26

## 2023-10-26 RX ORDER — SODIUM CHLORIDE, SODIUM LACTATE, POTASSIUM CHLORIDE, CALCIUM CHLORIDE 600; 310; 30; 20 MG/100ML; MG/100ML; MG/100ML; MG/100ML
9 INJECTION, SOLUTION INTRAVENOUS CONTINUOUS
Status: DISCONTINUED | OUTPATIENT
Start: 2023-10-26 | End: 2023-10-26 | Stop reason: HOSPADM

## 2023-10-26 RX ADMIN — FENTANYL CITRATE 50 MCG: 50 INJECTION, SOLUTION INTRAMUSCULAR; INTRAVENOUS at 12:41

## 2023-10-26 RX ADMIN — PROPOFOL 200 MG: 10 INJECTION, EMULSION INTRAVENOUS at 12:20

## 2023-10-26 RX ADMIN — FENTANYL CITRATE 50 MCG: 50 INJECTION, SOLUTION INTRAMUSCULAR; INTRAVENOUS at 12:29

## 2023-10-26 RX ADMIN — ONDANSETRON 4 MG: 2 INJECTION INTRAMUSCULAR; INTRAVENOUS at 12:20

## 2023-10-26 RX ADMIN — LIDOCAINE HYDROCHLORIDE 50 MG: 10 INJECTION, SOLUTION EPIDURAL; INFILTRATION; INTRACAUDAL; PERINEURAL at 12:20

## 2023-10-26 RX ADMIN — FAMOTIDINE 20 MG: 20 TABLET, FILM COATED ORAL at 11:54

## 2023-10-26 RX ADMIN — SODIUM CHLORIDE, POTASSIUM CHLORIDE, SODIUM LACTATE AND CALCIUM CHLORIDE: 600; 310; 30; 20 INJECTION, SOLUTION INTRAVENOUS at 12:15

## 2023-10-26 RX ADMIN — GLYCOPYRROLATE 0.1 MG: 0.4 INJECTION INTRAMUSCULAR; INTRAVENOUS at 12:24

## 2023-10-26 RX ADMIN — GLYCOPYRROLATE 0.1 MG: 0.4 INJECTION INTRAMUSCULAR; INTRAVENOUS at 12:23

## 2023-10-26 RX ADMIN — SODIUM CHLORIDE, POTASSIUM CHLORIDE, SODIUM LACTATE AND CALCIUM CHLORIDE 9 ML/HR: 600; 310; 30; 20 INJECTION, SOLUTION INTRAVENOUS at 11:53

## 2023-10-26 RX ADMIN — LIDOCAINE HYDROCHLORIDE 0.5 ML: 10 INJECTION, SOLUTION EPIDURAL; INFILTRATION; INTRACAUDAL; PERINEURAL at 11:52

## 2023-10-26 RX ADMIN — DEXAMETHASONE SODIUM PHOSPHATE 4 MG: 4 INJECTION, SOLUTION INTRAMUSCULAR; INTRAVENOUS at 12:20

## 2023-10-26 NOTE — ANESTHESIA PROCEDURE NOTES
Airway  Urgency: elective    Date/Time: 10/26/2023 12:21 PM  Airway not difficult    General Information and Staff    Patient location during procedure: OR  CRNA/CAA: Leo Prescott CRNA    Indications and Patient Condition  Indications for airway management: airway protection    Preoxygenated: yes  Mask difficulty assessment: 0 - not attempted    Final Airway Details  Final airway type: supraglottic airway      Successful airway: I-gel  Size 4     Number of attempts at approach: 1  Assessment: lips, teeth, and gum same as pre-op    Additional Comments  LMA placed without difficulty, ventilation with assist, equal breath sounds and symmetric chest rise and fall

## 2023-10-26 NOTE — OP NOTE
RECTAL FISTULECTOMY, SPHINCTEROTOMY  Procedure Report    Patient Name:  Mani Dailey  YOB: 1945    Date of Surgery:  10/26/2023       Pre-op Diagnosis:   Posterior anal fissure, anal spasm  Post-op Diagnosis:     Post-Op Diagnosis Codes:  Posterior anal fissure and anal spasm         Procedure: Lateral internal sphincterotomy and fissurectomy  Staff:  Surgeon(s):  Ant Rasheed MD               Anesthesia: General with local supplementation consisting of 30 mL of quarter percent Marcaine with epinephrine in the perianal skin and submucosal planes.    Estimated Blood Loss:  5 mL    Specimen:    Specimens       ID Source Type Tests Collected By Collected At Frozen?    A Large Intestine, Rectum Tissue TISSUE PATHOLOGY EXAM   Ant Rasheed MD 10/26/23 8104     Description: FISSURE              Findings: Patient had relative anal stenosis secondary to spasm and a posterior anal fissure with a small tag.          Complications: None          Description of Procedure:   After the patient was properly identified she was brought to the operating room and placed in comfortable supine position.  Once anesthesia been obtained she was placed in lithotomy position.  The buttocks were prepped and sterilely draped.  Timeout was performed.  The anal canal was examined with the findings noted above.  Local anesthesia was injected as described above.  Using a small Costa retractor the sphincter was placed on tension.  The internal sphincter was divided through a 1 cm right lateral perianal incision to the level of the dentate line under direct vision.  This allowed for good relaxation of the anal canal.  The anal canal allow passage of a large Costa retractor.  The overhanging skin edges and skin tag of the fissure were excised.  Hemostasis was obtained use electrocautery.  The specimen was sent to pathology.  The patient tolerated procedure well.  The sponge and needle count was correct.  A sterile  dressing was applied.  The patient was taken the recovery room in good condition.              Ant Rasheed MD     Date: 10/26/2023  Time: 12:53 EDT

## 2023-10-26 NOTE — INTERVAL H&P NOTE
"[facility] Pre-op    Full history and physical note from office is attached.    /78 (BP Location: Right arm, Patient Position: Lying)   Pulse 58   Temp 97.3 °F (36.3 °C) (Temporal)   Resp 15   Ht 162.6 cm (64\")   Wt 57.2 kg (126 lb)   SpO2 98%   BMI 21.63 kg/m²     LAB Results:  Lab Results   Component Value Date    WBC 7.28 10/25/2023    HGB 13.3 10/25/2023    HCT 40.1 10/25/2023    MCV 94.1 10/25/2023     10/25/2023    NEUTROABS 4.12 07/12/2020    GLUCOSE 89 10/25/2023    BUN 13 10/25/2023    CREATININE 1.16 (H) 10/25/2023     10/25/2023    K 3.7 10/25/2023     10/25/2023    CO2 23.9 10/25/2023    CALCIUM 8.7 10/25/2023         Impression: Anal fissure       Plan: RECTAL FISTULECTOMY; LATERAL INTERNAL SPHINCTEROTOMY       ITZ Zamorano   10/26/2023   11:44 EDT  "
Yes

## 2023-10-26 NOTE — ANESTHESIA PREPROCEDURE EVALUATION
Anesthesia Evaluation     history of anesthetic complications:  PONV               Airway   Mallampati: I  TM distance: >3 FB  Neck ROM: full  No difficulty expected  Dental      Pulmonary    (+) asthma,  Cardiovascular     ECG reviewed        Neuro/Psych  (+) dizziness/light headedness, numbness, psychiatric history Anxiety and Depression  GI/Hepatic/Renal/Endo      Musculoskeletal     Abdominal    Substance History      OB/GYN          Other   arthritis,                 Anesthesia Plan    ASA 2     general     intravenous induction     Anesthetic plan, risks, benefits, and alternatives have been provided, discussed and informed consent has been obtained with: patient.    Plan discussed with CRNA.    CODE STATUS:

## 2023-10-26 NOTE — ANESTHESIA POSTPROCEDURE EVALUATION
Patient: Mani Dailey    Procedure Summary       Date: 10/26/23 Room / Location:  [facility]    Anesthesia Start: 1215 Anesthesia Stop:     Procedures:       FISSURECTOMY (Rectum)      LATERAL INTERNAL SPHINCTEROTOMY (Anus) Diagnosis:     Surgeons: Ant Rasheed MD Provider: Wagner Nails MD    Anesthesia Type: general ASA Status: 2            Anesthesia Type: general    Vitals  No vitals data found for the desired time range.          Post Anesthesia Care and Evaluation    Patient location during evaluation: PACU  Patient participation: complete - patient participated  Level of consciousness: awake and alert  Pain management: adequate    Airway patency: patent  Anesthetic complications: No anesthetic complications  PONV Status: none  Cardiovascular status: hemodynamically stable and acceptable  Respiratory status: nonlabored ventilation, acceptable and nasal cannula  Hydration status: acceptable    Comments: Rr12 97.0 97% hr 84 120/71

## 2023-10-27 LAB
CYTO UR: NORMAL
LAB AP CASE REPORT: NORMAL
LAB AP CLINICAL INFORMATION: NORMAL
PATH REPORT.FINAL DX SPEC: NORMAL
PATH REPORT.GROSS SPEC: NORMAL

## 2023-10-30 LAB
QT INTERVAL: 452 MS
QTC INTERVAL: 428 MS

## (undated) DEVICE — SHEET,DRAPE,40X58,STERILE: Brand: MEDLINE

## (undated) DEVICE — ANTIBACTERIAL UNDYED BRAIDED (POLYGLACTIN 910), SYNTHETIC ABSORBABLE SUTURE: Brand: COATED VICRYL

## (undated) DEVICE — BNDG ELAS ELITE V/CLOSE 3IN 5YD LF STRL

## (undated) DEVICE — STRAP STIRUP WO/RNG 19X3.5IN DISP

## (undated) DEVICE — PREMIUM DRY TRAY LF: Brand: MEDLINE INDUSTRIES, INC.

## (undated) DEVICE — DRSNG GZ CURAD XEROFORM NONADHR OVERWRAP 5X9IN

## (undated) DEVICE — DECANT BG O JET

## (undated) DEVICE — NDL HYPO REG/BVL 30G 1IN LF

## (undated) DEVICE — SUT VIC 4/0 P3 18IN J494G

## (undated) DEVICE — SUT ETHLN PLSTC P1 6/0 18IN 697H

## (undated) DEVICE — SUT SILK 0 SH 30IN K834H

## (undated) DEVICE — ELECTRD NDL EZ CLN MOD 2.75IN

## (undated) DEVICE — NEEDLE, QUINCKE 22GX3.5": Brand: MEDLINE INDUSTRIES, INC.

## (undated) DEVICE — Device

## (undated) DEVICE — SUT VIC 5/0 PS2 18IN J495H

## (undated) DEVICE — SUT SILK 2/0 PS 18IN 1588H

## (undated) DEVICE — INTENDED FOR TISSUE SEPARATION, AND OTHER PROCEDURES THAT REQUIRE A SHARP SURGICAL BLADE TO PUNCTURE OR CUT.: Brand: BARD-PARKER ® SAFETYLOCK CARBON RIB-BACK BLADES

## (undated) DEVICE — JELLY,LUBE,STERILE,FLIP TOP,TUBE,2-OZ: Brand: MEDLINE

## (undated) DEVICE — SYR CONTRL LUERLOK 10CC

## (undated) DEVICE — SPNG GZ WOVN 4X4IN 12PLY 10/BX STRL

## (undated) DEVICE — NEEDLE,HYPODERM,SAFETY, 22GX1.5: Brand: MEDLINE

## (undated) DEVICE — 3M™ STERI-STRIP™ REINFORCED ADHESIVE SKIN CLOSURES, R1547, 1/2 IN X 4 IN (12 MM X 100 MM), 6 STRIPS/ENVELOPE: Brand: 3M™ STERI-STRIP™

## (undated) DEVICE — BANDAGE,GAUZE,BULKEE II,4.5"X4.1YD,STRL: Brand: MEDLINE

## (undated) DEVICE — SUT MNCRYL PLS ANTIB UD 4/0 PS2 18IN

## (undated) DEVICE — PROXIMATE RH ROTATING HEAD SKIN STAPLERS (35 WIDE) CONTAINS 35 STAINLESS STEEL STAPLES: Brand: PROXIMATE

## (undated) DEVICE — CVR HNDL LIGHT RIGID

## (undated) DEVICE — SUT SILK 2/0 TIES 18IN A185H

## (undated) DEVICE — DRAPE,UNDERBUTTOCKS,STERILE: Brand: MEDLINE

## (undated) DEVICE — 3M™ STERI-DRAPE™ INSTRUMENT POUCH 1018: Brand: STERI-DRAPE™

## (undated) DEVICE — GLV SURG SENSICARE PI MIC PF SZ7.5 LF STRL

## (undated) DEVICE — BNDG GZ SOF-FORM CONFRM 3X75IN LF STRL

## (undated) DEVICE — EYE SPEAR / FINE DISSECTOR: Brand: DEROYAL

## (undated) DEVICE — SYR LL 3CC

## (undated) DEVICE — PK MAJ ENT 10

## (undated) DEVICE — GOWN,NON-REINFORCED,SIRUS,SET IN SLV,XL: Brand: MEDLINE

## (undated) DEVICE — DRAPE,UNDERBUTTOCKS,PCH,STERILE: Brand: MEDLINE

## (undated) DEVICE — PK MINOR SPLT 10

## (undated) DEVICE — TBG PENCL TELESCP MEGADYNE SMOKE EVAC 10FT

## (undated) DEVICE — INTENDED TO BE USED TO OCCLUDE, RETRACT AND IDENTIFY ARTERIES, VEINS, TENDONS AND NERVES IN SURGICAL PROCEDURES: Brand: STERION®  VESSEL LOOP

## (undated) DEVICE — ELECTRD NDL EDGE/INSUL/PFTE.787MM 2.84IN

## (undated) DEVICE — CLTH CLENS READYCLEANSE PERI CARE PK/5

## (undated) DEVICE — GOWN,PREVENTION PLUS,XXLARGE,STERILE: Brand: MEDLINE

## (undated) DEVICE — GLV SURG BIOGEL LTX PF 6

## (undated) DEVICE — SOL ANTISEP SCRB CHG 4PCT 4OZ

## (undated) DEVICE — TOTAL TRAY, 16FR 10ML SIL FOLEY, URN: Brand: MEDLINE

## (undated) DEVICE — LEGGINGS, PAIR, 29X43, STERILE: Brand: MEDLINE

## (undated) DEVICE — NDL HYPO SFTY PROEDGE 27G 1 1/4IN GRY

## (undated) DEVICE — DRSNG GZ PETROLTM XEROFORM CURAD 1X8IN STRL